# Patient Record
(demographics unavailable — no encounter records)

---

## 2024-10-25 NOTE — REVIEW OF SYSTEMS
[Fever] : fever [Chills] : no chills [Difficulty with Sleep] : no difficulty with sleep [Headache] : no headache [Ear Pain] : no ear pain [Nasal Congestion] : nasal congestion [Sore Throat] : no sore throat [Tachypnea] : not tachypneic [Wheezing] : no wheezing [Cough] : cough [Shortness of Breath] : no shortness of breath [Appetite Changes] : appetite changes [Vomiting] : no vomiting [Diarrhea] : no diarrhea [Myalgia] : no myalgia [Rash] : no rash

## 2024-10-25 NOTE — DISCUSSION/SUMMARY
[FreeTextEntry1] :  Pleasant 10 year old girl with PMH of intermittent asthma and obesity presents for F/U 2 UC visits & 1 ER visit within 3 days for fever and cough, dx with KIM PNA (radiographically-confirmed), RVP neg Completed Azithro 5 day course, half-way through Amox 10 day course Taking Albuterol 2 puffs q4h ATC for past 48 hours  Persistent fever for 6 days (albeit improved fever curve) and worsening cough for 1 day Decreased aeration on lung exam 4.5 hours post-albuterol  Concern for mild to moderate dehydration by hx  2 BTB Albuterol/Ipratropium nebs administered with improvement in aeration noted only after 2nd duoneb With improved aeration, asymmetry of breath sounds noted on exam... L lung overall clear, R lung diminished breath sounds with faint crackles  No wheezing auscultated on presentation or after nebs Prednisolone 1 mg/kg (17 mL) administered  O2 sat 94-96% throughout duration of appt  1) Pneumonia - Complete Amox course - Take OTC probiotic daily while taking antibiotic  - Continue PRN antipyretics  - Return to ER for persistent fever and cough  2) Asthma exacerbation - Continue Albuterol q4h ATC, increase to 4 puffs/dose  - Prescribed Pred 1 mg/kg once daily for an additional 4 days (10/25-10/28) - Reviewed s/sx of resp distress for which to seek urgent medical attention - F/U for resp check in 2 days   Peds Pulm referral due to 2 episodes of radiographically-diagnosed pneumonia (June 2024 & Oct 2024)  Candidate for PCV-20 or PPSV-23 vaccine?

## 2024-10-25 NOTE — PHYSICAL EXAM
[Conjuctival Injection] : no conjunctival injection [Enlarged Tonsils] : tonsils not enlarged [Exudate] : no exudate [Wheezing] : no wheezing [Crackles] : no crackles [Rhonchi] : no rhonchi [Murmur] : no murmur [FreeTextEntry1] : well-appearing, smiling, quiet, coughs during deep respirations  [FreeTextEntry3] : minimal L serous middle ear fluid [FreeTextEntry7] : normal resp rate and effort; poor aeration

## 2024-10-25 NOTE — HISTORY OF PRESENT ILLNESS
[de-identified] : pneumonia [FreeTextEntry6] :  Acute onset of headache on 10/17 Low-grade fever on 10/19 UC visit on 10/20 for fever, CXR noted PNA, prescribed Amox 400mg/5mL 11 mL BID for 10 days ([facility] through course) and Azithromycin 200mg/5mL 11 mL on day 1 and 5.5 mL on days 2-5 (completed) High fever to 103 so UC visit on 10/22, CXR noted worsening L-sided PNA, referred to ER for IV fluids and IV antibiotics  In [facility], RVP neg, CXR KIM PNA (no effusions), no blood work or IV fluids ordered; recommended Albuterol q4h ATC for 48 hours, continue current antibiotics, and F/U with PMD  Interval hx: Since ER visit, taking Albuterol 2 puffs every 4 hours while awake, last dose at 1 pm Worsening cough for past 24 hours, with 1 episode of post-tussive emesis No audible wheezing Child denies chest pain/tightness or SOB Last dose of Tylenol on 10/23 evening for temp 100 Mother is most concerned about fatigue, particularly when walking short distances (SOB?) Decreased appetite for nearly 1 week, minimal food intake today Drinking water when reminded to Urinated at least 3x today, urine appears concentrated  PMH of intermittent asthma, triggered by URIs Prior to this week, had not taken Albuterol for a couple of years Prior exacerbations requiring oral steroid, no asthma-related hospitalizations Hx of pneumonia (diagnosed by CXR at [facility] ) in June 2024; prescribed oral antibiotic Complained of headache prior to onset of fever and resp sx during that episode as well Was well after completion of antibiotic course

## 2024-10-28 NOTE — DISCUSSION/SUMMARY
[FreeTextEntry1] : 10y F with moderate persistent asthma presenting for follow up after treatment of radiographic pneumonia and course of oral steroids.   Overall at this visit she does not have increased work of breathing, tachypnea, or hypoxia. On lung exam, she has good aeration throughout with no wheezing or focal findings. However, she continues to have subjective shortness of breath and this is now her second course of oral steroids within a 6 month period, putting her in the moderate persistent asthma category.   She has been taking 2 puffs of albuterol every 4 hours during this illness. Based on her age, we have instructed her to take 4 puffs every 4 hours for the next 3-5 days, or until she is feeling better. Additionally, she has been started on a controller medication for her asthma, Fluticasone 110mcg 2 puffs once daily. She and her father have been instructed to take this medication every single day, whether she is sick or not.   Asthma action plan was completed and reviewed with them. At this time, no additional oral steroids or antibiotics are recommended. RTC in 1 month to follow up asthma control.

## 2024-10-28 NOTE — HISTORY OF PRESENT ILLNESS
[EENT/Resp Symptoms] : EENT/RESPIRATORY SYMPTOMS [de-identified] : asthma, follow up [FreeTextEntry6] : 10y F with hx of mild intermittent asthma presenting for follow up from last visit on Thursday 10/24. Was diagnosed with pneumonia last week at  and started on Amoxicillin and Azithromycin, both complete as of today. Seen last Thurs here and given 2x B2B and oral steroids, 5d course completed today. Today Sondra says she is still feeling short of breath but better than before. She has been taking 2 puffs of the albuterol every 4 hours, and it has been helpful. Her dad mentions that she had a similar asthma exacerbation a few months ago where she also required oral steroids and took a while to get better. She is also feeling generally tired. No fevers, headache, abdominal pain, chest pain.

## 2024-10-28 NOTE — PHYSICAL EXAM
[Alert] : alert [EOMI] : grossly EOMI [FROM] : full passive range of motion [Clear to Auscultation Bilaterally] : clear to auscultation bilaterally [Regular Rate and Rhythm] : regular rate and rhythm [Normal S1, S2 audible] : normal S1, S2 audible [Soft] : soft [Moves All Extremities x 4] : moves all extremities x4 [Warm, Well Perfused x4] : warm, well perfused x4 [Warm] : warm [Clear] : clear [Dry] : dry [Acute Distress] : no acute distress [Toxic] : not toxic [Conjuctival Injection] : no conjunctival injection [Clear Rhinorrhea] : no rhinorrhea [Nasal Flaring] : no nasal flaring [Erythematous Oropharynx] : nonerythematous oropharynx [Wheezing] : no wheezing [Rales] : no rales [Crackles] : no crackles [Tachypnea] : no tachypnea [Rhonchi] : no rhonchi [Subcostal Retractions] : no subcostal retractions [Suprasternal Retractions] : no suprasternal retractions [Murmur] : no murmur [Tender] : nontender [Distended] : nondistended

## 2024-11-13 NOTE — DISCUSSION/SUMMARY
[Normal Growth] : growth [Normal Development] : development  [No Elimination Concerns] : elimination [Continue Regimen] : feeding [No Skin Concerns] : skin [Normal Sleep Pattern] : sleep [None] : no medical problems [Anticipatory Guidance Given] : Anticipatory guidance addressed as per the history of present illness section [School] : school [Development and Mental Health] : development and mental health [Nutrition and Physical Activity] : nutrition and physical activity [Oral Health] : oral health [Safety] : safety [No Vaccines] : no vaccines needed [No Medications] : ~He/She~ is not on any medications [Patient] : patient [Parent/Guardian] : Parent/Guardian [FreeTextEntry1] : Sondra is a 10 yo girl with moderate persistent asthma on a daily controller medication and 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 Her asthma is well controlled and family is having good compliance with medication. She is experiencing worsening of her Achillies related symptoms, and should return to her specialist as well as wear her AFOs as she best she can tolerate. Sondra could use support in her diet and family is interested in working with a nutritionist.  - Call orthopedist to discuss AFO use.  - Family to be connected to social work for 215 form, for transportation. - Meet with dietician. - Will schedule follow up for flu vaccine  Discussed and counseled on components of 5-2-1-0: healthy active living with patient and family.   Recommended:  5 servings of fruits and vegetables per day, less than 2 hours of screen time per day, 1 hour of exercise per day, and ZERO sugar sweetened beverages. Continue to brush teeth twice daily with fluoride-containing toothpaste and make appointment to see dentist. Help child to maintain consistent daily routines and sleep schedule.  Personal hygiene and puberty explained.  School discussed. Ensure home is safe. Teach child about personal safety.  Use consistent, positive discipline.  SDOH domains were screened and scored. Return 1 year for routine well child check.

## 2024-11-13 NOTE — PHYSICAL EXAM
[Alert] : alert [No Acute Distress] : no acute distress [Normocephalic] : normocephalic [Conjunctivae with no discharge] : conjunctivae with no discharge [PERRL] : PERRL [EOMI Bilateral] : EOMI bilateral [Auricles Well Formed] : auricles well formed [Clear Tympanic membranes with present light reflex and bony landmarks] : clear tympanic membranes with present light reflex and bony landmarks [No Discharge] : no discharge [Nares Patent] : nares patent [Pink Nasal Mucosa] : pink nasal mucosa [Palate Intact] : palate intact [Nonerythematous Oropharynx] : nonerythematous oropharynx [Supple, full passive range of motion] : supple, full passive range of motion [No Palpable Masses] : no palpable masses [Symmetric Chest Rise] : symmetric chest rise [Clear to Auscultation Bilaterally] : clear to auscultation bilaterally [Regular Rate and Rhythm] : regular rate and rhythm [Normal S1, S2 present] : normal S1, S2 present [No Murmurs] : no murmurs [+2 Femoral Pulses] : +2 femoral pulses [Soft] : soft [NonTender] : non tender [Non Distended] : non distended [Normoactive Bowel Sounds] : normoactive bowel sounds [No Hepatomegaly] : no hepatomegaly [No Splenomegaly] : no splenomegaly [Patent] : patent [No fissures] : no fissures [No Abnormal Lymph Nodes Palpated] : no abnormal lymph nodes palpated [No pain or deformities with palpation of bone, muscles, joints] : no pain or deformities with palpation of bone, muscles, joints [Normal Muscle Tone] : normal muscle tone [Straight] : straight [de-identified] : Walks on tip toes, splaying of toes bilaterally. No TTP at Achillies insertion site.

## 2024-11-13 NOTE — PHYSICAL EXAM
[Alert] : alert [No Acute Distress] : no acute distress [Normocephalic] : normocephalic [Conjunctivae with no discharge] : conjunctivae with no discharge [PERRL] : PERRL [EOMI Bilateral] : EOMI bilateral [Auricles Well Formed] : auricles well formed [Clear Tympanic membranes with present light reflex and bony landmarks] : clear tympanic membranes with present light reflex and bony landmarks [No Discharge] : no discharge [Nares Patent] : nares patent [Pink Nasal Mucosa] : pink nasal mucosa [Palate Intact] : palate intact [Nonerythematous Oropharynx] : nonerythematous oropharynx [Supple, full passive range of motion] : supple, full passive range of motion [No Palpable Masses] : no palpable masses [Symmetric Chest Rise] : symmetric chest rise [Clear to Auscultation Bilaterally] : clear to auscultation bilaterally [Regular Rate and Rhythm] : regular rate and rhythm [Normal S1, S2 present] : normal S1, S2 present [No Murmurs] : no murmurs [+2 Femoral Pulses] : +2 femoral pulses [Soft] : soft [NonTender] : non tender [Non Distended] : non distended [Normoactive Bowel Sounds] : normoactive bowel sounds [No Hepatomegaly] : no hepatomegaly [No Splenomegaly] : no splenomegaly [Patent] : patent [No fissures] : no fissures [No Abnormal Lymph Nodes Palpated] : no abnormal lymph nodes palpated [No pain or deformities with palpation of bone, muscles, joints] : no pain or deformities with palpation of bone, muscles, joints [Normal Muscle Tone] : normal muscle tone [Straight] : straight [de-identified] : Walks on tip toes, splaying of toes bilaterally. No TTP at Achillies insertion site.

## 2024-11-13 NOTE — HISTORY OF PRESENT ILLNESS
[Normal] : Normal [Sleeps ___ hours per night] : sleeps [unfilled] hours per night [Brushing teeth twice/d] : brushing teeth twice per day [Yes] : Patient goes to dentist yearly [Premenarche] : premenarche [Appropiate parent-child-sibling interaction] : appropriate parent-child-sibling interaction [Has Friends] : has friends [Has chance to make own decisions] : has chance to make own decisions [Adequate social interactions] : adequate social interactions [Adequate performance] : adequate performance [No difficulties with Homework] : no difficulties with homework [No] : No cigarette smoke exposure [Appropriately restrained in motor vehicle] : appropriately restrained in motor vehicle [Supervised outdoor play] : supervised outdoor play [Wears helmet and pads] : wears helmet and pads [Parent knows child's friends] : parent knows child's friends [Parent discusses safety rules regarding adults] : parent discusses safety rules regarding adults [Family discusses home emergency plan] : family discusses home emergency plan [Monitored computer use] : monitored computer use [NO] : No [Exposure to tobacco] : no exposure to tobacco [Exposure to alcohol] : no exposure to alcohol [Exposure to electronic nicotine delivery system] : No exposure to electronic nicotine delivery system [Exposure to illicit drugs] : no exposure to illicit drugs [de-identified] : Sister [FreeTextEntry9] : Participates in gym, goes to PT 2x a week, other exercise limited. Interested in joining an after school activity.  [de-identified] : Enjoys rice and martin. Per family, She eats a lot of junk food and they could benefit from a plan. She has not seen a nutritionist before, but would be interested in seeing them. [de-identified] : Due for flu but will schedule for a follow up soon.  [FreeTextEntry1] : Pt was recently hospitalized for radiologically significant pneumonia. She was treated and is now improved. She is now on a daily controller medication and has been benefitting from the treatment. No concerns with adherence expressed from Mom.     Pt has bilateral shortened Achillies, now has AFOs but having a hard time with compliance due to pain. Pt recent illness also decreased ability to adhere to schedule. Plan was to wear for one year during waking hours per orthopedist. Pt is in PT for this concern, however, she seems to have a worsening of symptoms. Would recommend increased use and return to specialist for reevaluation. Family could also use assistance with transportation for these health appointments, they need a 215 form from social work.   Discussed possibility of seeing a nutritionist and importance of physical activity.   Due for flu vaccine but doesn't want to get it today, because she has a field trip tomorrow. Discussed importance of getting it soon given asthma hx.  Family Cardiac screen- Have you ever fainted, passed out or had an unexplained seizure suddenly and without warning, especially during exercise or in response to a certain loud noise such as doorbells, alarm clocks and ringing telephones?  NO Have you ever had exercise related chest pains or shortness of breath?  NO Has anyone in your immediate family (parents, grandparents, siblings) or other more distinct relatives (aunts, uncles, cousins)  of heart problems or had an unexpected sudden death before age 50? This would include unexpected drownings, unexplained car accident in which the relative was driving or sudden infant death syndrome.  NO Are you related to anyone with hypertrophic cardiomyopathy or hypertrophic obstructive cardiomyopathy, Marfan syndrome, arrhythmogenic right ventricular cardiomyopathy, long QT syndrome, short QT syndrome, Brugada syndrome or catecholaminergic polymorphic ventricular tachycardia or anyone younger than 50 years with a pacemaker or implantable defibrillator?  NO   Cardiac screen NEGATIVE for increased risk of cardiovascular disease.

## 2024-11-13 NOTE — HISTORY OF PRESENT ILLNESS
[Normal] : Normal [Sleeps ___ hours per night] : sleeps [unfilled] hours per night [Brushing teeth twice/d] : brushing teeth twice per day [Yes] : Patient goes to dentist yearly [Premenarche] : premenarche [Appropiate parent-child-sibling interaction] : appropriate parent-child-sibling interaction [Has Friends] : has friends [Has chance to make own decisions] : has chance to make own decisions [Adequate social interactions] : adequate social interactions [Adequate performance] : adequate performance [No difficulties with Homework] : no difficulties with homework [No] : No cigarette smoke exposure [Appropriately restrained in motor vehicle] : appropriately restrained in motor vehicle [Supervised outdoor play] : supervised outdoor play [Wears helmet and pads] : wears helmet and pads [Parent knows child's friends] : parent knows child's friends [Parent discusses safety rules regarding adults] : parent discusses safety rules regarding adults [Family discusses home emergency plan] : family discusses home emergency plan [Monitored computer use] : monitored computer use [NO] : No [Exposure to tobacco] : no exposure to tobacco [Exposure to alcohol] : no exposure to alcohol [Exposure to electronic nicotine delivery system] : No exposure to electronic nicotine delivery system [Exposure to illicit drugs] : no exposure to illicit drugs [de-identified] : Sister [FreeTextEntry9] : Participates in gym, goes to PT 2x a week, other exercise limited. Interested in joining an after school activity.  [de-identified] : Enjoys rice and martin. Per family, She eats a lot of junk food and they could benefit from a plan. She has not seen a nutritionist before, but would be interested in seeing them. [de-identified] : Due for flu but will schedule for a follow up soon.  [FreeTextEntry1] : Pt was recently hospitalized for radiologically significant pneumonia. She was treated and is now improved. She is now on a daily controller medication and has been benefitting from the treatment. No concerns with adherence expressed from Mom.     Pt has bilateral shortened Achillies, now has AFOs but having a hard time with compliance due to pain. Pt recent illness also decreased ability to adhere to schedule. Plan was to wear for one year during waking hours per orthopedist. Pt is in PT for this concern, however, she seems to have a worsening of symptoms. Would recommend increased use and return to specialist for reevaluation. Family could also use assistance with transportation for these health appointments, they need a 215 form from social work.   Discussed possibility of seeing a nutritionist and importance of physical activity.   Due for flu vaccine but doesn't want to get it today, because she has a field trip tomorrow. Discussed importance of getting it soon given asthma hx.  Family Cardiac screen- Have you ever fainted, passed out or had an unexplained seizure suddenly and without warning, especially during exercise or in response to a certain loud noise such as doorbells, alarm clocks and ringing telephones?  NO Have you ever had exercise related chest pains or shortness of breath?  NO Has anyone in your immediate family (parents, grandparents, siblings) or other more distinct relatives (aunts, uncles, cousins)  of heart problems or had an unexpected sudden death before age 50? This would include unexpected drownings, unexplained car accident in which the relative was driving or sudden infant death syndrome.  NO Are you related to anyone with hypertrophic cardiomyopathy or hypertrophic obstructive cardiomyopathy, Marfan syndrome, arrhythmogenic right ventricular cardiomyopathy, long QT syndrome, short QT syndrome, Brugada syndrome or catecholaminergic polymorphic ventricular tachycardia or anyone younger than 50 years with a pacemaker or implantable defibrillator?  NO   Cardiac screen NEGATIVE for increased risk of cardiovascular disease.

## 2024-12-24 NOTE — ASSESSMENT
[FreeTextEntry1] : 12 yo female with a known diagnosis of asthma, moderate persistent.  She was actually diagnosed at age 3 yo.and was on prn albuterol. Note of family history of asthma - mom and older sister with asthma. Asthma had apparently been intermittent with mild symptoms in the wintertime until this year 2024 following 2 episodes of pneumonia in June and most recently in October. Both episodes were associated with wheezing requiring oral steroids and initiation of inhaled steroid in November. Mom is concerned about evolving allergic triggers aside from viral URIs.  Spirometry technique is suboptimal - her first attempt at testing. Exam however was unremarkable, i..e, with clear lungs, with the exception of postnasal drip.  Asthma is confounded by being overweight. Obesity/overweight is associated with deleterious effects on the respiratory system and specifically can confound airway/lung disease.  It alters lung and chest wall mechanics resulting in airway closure, decreases the threshold for dyspnea, is associated with increased propensity for CHEPE and SHONNA which in and of themselves can confound airway disease, and is associated with systemic inflammation.  This said, she is noted to snore. Sleep disordered breathing can confound lower airway disease.  Allergic triggers likewise suspected such that she has signs and symptoms of rhinitis with postnasal drip that likewise confound lower airway disease. NO history of allergy testing. No history of eczema.  I reviewed the diagnosis of asthma, the rationale and indications for rescue and controller medications, the concept of step up and step down care vis--vis understanding seasonality, triggers and response to medications, and the appropriate manner of using or taking medications.  The patient received teaching in this regard.  A lung model was used for teaching purposes.  Moreover I prescribed Flonase and saline nebs.  Recommendations: 1. Continue Fluticasone propionate 110 ucg at 2 puffs BID. Rinse mouth after use. 2.  Indications for albuterol were reviewed. 3.  Flonase and saline nebs or saline sprays as discussed. 4.  Observe snoring. 5.  Allergy clinic referral. 6.  Discussed weight management. 7. Follow up in 6-8 weeks.

## 2024-12-24 NOTE — HISTORY OF PRESENT ILLNESS
[FreeTextEntry1] : Per PCP note 10/28/24: 10y F with hx of mild intermittent asthma presenting for follow up from last visit on Thursday 10/24. Was diagnosed with pneumonia last week at  and started on Amoxicillin and Azithromycin, both complete as of today. Seen last Thurs here and given 2x B2B and oral steroids, 5d course completed today. Today Sondra says she is still feeling short of breath but better than before. She has been taking 2 puffs of the albuterol every 4 hours, and it has been helpful. Her dad mentions that she had a similar asthma exacerbation a few months ago where she also required oral steroids and took a while to get better. She is also feeling generally tired. No fevers, headache, abdominal pain, chest pain. Dx was moderate persistent asthma, prescribed Fluticasone propionate 110 ucg a5 2 puffs BID. Had 2 courses of oral steroids in 6 months.  Well child visit 11/10/24: Pt was recently hospitalized for radiologically significant pneumonia. She was treated and is now improved. She is now on a daily controller medication and has been benefitting from the treatment. No concerns with adherence expressed from Mom.  Diagnosed with asthma at age 3 years old. Had URIs that were associated with cough and wheeze. Prescribed nebulized albuterol then. Had been stable through the years with only intermittent use of albuterol sonia. in the wintertime. However, this year, has had 2 episodes of pneumonia: in June 2024 and most recently Nov. where CXR in ED showed KIM pneumonia  Birth Hx: Term baby, no complications.  Past Hx: Immunizations are up to date. No food allergies or eczema. No hospitalizations except for hospitalization except at age 3 years for skull fracture.  Family Hx: MOm and Older sister with asthma.  Personal and Social Hx: Youngest of 3 siblings. [facility] at [facility]. No cigarette smokers at home. Nedroom is carpeted. Lots of stuffed toys.

## 2024-12-24 NOTE — HISTORY OF PRESENT ILLNESS
[FreeTextEntry1] : Per PCP note 10/28/24: 10y F with hx of mild intermittent asthma presenting for follow up from last visit on Thursday 10/24. Was diagnosed with pneumonia last week at  and started on Amoxicillin and Azithromycin, both complete as of today. Seen last Thurs here and given 2x B2B and oral steroids, 5d course completed today. Today Sondra says she is still feeling short of breath but better than before. She has been taking 2 puffs of the albuterol every 4 hours, and it has been helpful. Her dad mentions that she had a similar asthma exacerbation a few months ago where she also required oral steroids and took a while to get better. She is also feeling generally tired. No fevers, headache, abdominal pain, chest pain. Dx was moderate persistent asthma, prescribed Fluticasone propionate 110 ucg a5 2 puffs BID. Had 2 courses of oral steroids in 6 months.  Well child visit 11/10/24: Pt was recently hospitalized for radiologically significant pneumonia. She was treated and is now improved. She is now on a daily controller medication and has been benefitting from the treatment. No concerns with adherence expressed from Mom.  Diagnosed with asthma at age 3 years old. Had URIs that were associated with cough and wheeze. Prescribed nebulized albuterol then. Had been stable through the years with only intermittent use of albuterol sonia. in the wintertime. However, this year, has had 2 episodes of pneumonia: in June 2024 and most recently Nov. where CXR in ED showed KIM pneumonia  Birth Hx: Term baby, no complications.  Past Hx: Immunizations are up to date. No food allergies or eczema. No hospitalizations except for hospitalization except at age 3 years for skull fracture.  Family Hx: MOm and Older sister with asthma.  Personal and Social Hx: Youngest of 3 siblings. [facility]. No cigarette smokers at home. Nedroom is carpeted. Lots of stuffed toys.

## 2024-12-24 NOTE — ASSESSMENT
[FreeTextEntry1] : 10 yo female with a known diagnosis of asthma, moderate persistent.  She was actually diagnosed at age 3 yo.and was on prn albuterol. Note of family history of asthma - mom and older sister with asthma. Asthma had apparently been intermittent with mild symptoms in the wintertime until this year 2024 following 2 episodes of pneumonia in June and most recently in October. Both episodes were associated with wheezing requiring oral steroids and initiation of inhaled steroid in November. Mom is concerned about evolving allergic triggers aside from viral URIs.  Spirometry technique is suboptimal - her first attempt at testing. Exam however was unremarkable, i..e, with clear lungs, with the exception of postnasal drip.  Asthma is confounded by being overweight. Obesity/overweight is associated with deleterious effects on the respiratory system and specifically can confound airway/lung disease.  It alters lung and chest wall mechanics resulting in airway closure, decreases the threshold for dyspnea, is associated with increased propensity for CHEPE and SHONNA which in and of themselves can confound airway disease, and is associated with systemic inflammation.  This said, she is noted to snore. Sleep disordered breathing can confound lower airway disease.  Allergic triggers likewise suspected such that she has signs and symptoms of rhinitis with postnasal drip that likewise confound lower airway disease. NO history of allergy testing. No history of eczema.  I reviewed the diagnosis of asthma, the rationale and indications for rescue and controller medications, the concept of step up and step down care vis--vis understanding seasonality, triggers and response to medications, and the appropriate manner of using or taking medications.  The patient received teaching in this regard.  A lung model was used for teaching purposes.  Moreover I prescribed Flonase and saline nebs.  Recommendations: 1. Continue Fluticasone propionate 110 ucg at 2 puffs BID. Rinse mouth after use. 2.  Indications for albuterol were reviewed. 3.  Flonase and saline nebs or saline sprays as discussed. 4.  Observe snoring. 5.  Allergy clinic referral. 6.  Discussed weight management. 7. Follow up in 6-8 weeks.

## 2024-12-24 NOTE — HISTORY OF PRESENT ILLNESS
[FreeTextEntry1] : Per PCP note 10/28/24: 10y F with hx of mild intermittent asthma presenting for follow up from last visit on Thursday 10/24. Was diagnosed with pneumonia last week at  and started on Amoxicillin and Azithromycin, both complete as of today. Seen last Thurs here and given 2x B2B and oral steroids, 5d course completed today. Today Sondra says she is still feeling short of breath but better than before. She has been taking 2 puffs of the albuterol every 4 hours, and it has been helpful. Her dad mentions that she had a similar asthma exacerbation a few months ago where she also required oral steroids and took a while to get better. She is also feeling generally tired. No fevers, headache, abdominal pain, chest pain. Dx was moderate persistent asthma, prescribed Fluticasone propionate 110 ucg a5 2 puffs BID. Had 2 courses of oral steroids in 6 months.  Well child visit 11/10/24: Pt was recently hospitalized for radiologically significant pneumonia. She was treated and is now improved. She is now on a daily controller medication and has been benefitting from the treatment. No concerns with adherence expressed from Mom.  Diagnosed with asthma at age 3 years old. Had URIs that were associated with cough and wheeze. Prescribed nebulized albuterol then. Had been stable through the years with only intermittent use of albuterol sonia. in the wintertime. However, this year, has had 2 episodes of pneumonia: in June 2024 and most recently Nov. where CXR in ED showed KIM pneumonia  Birth Hx: Term baby, no complications.  Past Hx: Immunizations are up to date. No food allergies or eczema. No hospitalizations except for hospitalization except at age 3 years for skull fracture.  Family Hx: MOm and Older sister with asthma.  Personal and Social Hx: Youngest of 3 siblings. No pets at [employer]. No cigarette smokers at home. Nedroom is carpeted. Lots of stuffed toys.

## 2025-01-03 NOTE — END OF VISIT
[FreeTextEntry3] : I, Williams Jauregui MD, I personally performed the services described in the documentation, reviewed the documentation recorded by the scribe in my presence and it accurately and completely records my words and actions

## 2025-01-03 NOTE — HISTORY OF PRESENT ILLNESS
[FreeTextEntry1] : 11-year-old female presents today with her parents for follow up bilateral tight Achilles. She was initially seen in our office on 01/05/2023 and recommended for MRI and neurology consultation due to change in gait. Her MRI was done in March and was unremarkable. She was also evaluated by neurologist who discussed that she likely had habitual toe walking. Please see prior clinic notes for additional information.    She was last seen on 07/27/2023 and recommended for utilization of bilateral AFOs. Today her father states that she is using AFO braces. She does not have any issues with braces. Denies any radiating pain or tingling sensation. Here for further orthopedic evaluation.

## 2025-01-03 NOTE — ASSESSMENT
[FreeTextEntry1] : 11-year-old female with toe walking and Achilles contracture.  The history was obtained today from the child and parent; given the patient's age, the history was unreliable, and the parent was used as an independent historian.  Clinical findings and diagnosis were discussed at length with family. The natural history of the above condition was discussed.  Clinically, she has increased tightness and continues to be unable to walk flat even after home stretches.  I am continuing to recommend bilateral hinged AFOs to encourage a heel-toe gait and to stretch the Achilles tendons.  In addition, she will continue physical therapy for Achilles tightness. If she is unable to obtain braces operative intervention may be required. We also discussed about gastrocnemius recession for further management.  There are no orthopedic activity restrictions at this time.  We will plan to see her back in clinic approximately 2 months for repeat reevaluation.  All questions and concerns were addressed today. Parent and patient verbalize understanding and agree with plan of care.  I, Fatuma Valentino, have acted as a scribe and documented the above information for Dr. Jauregui

## 2025-01-03 NOTE — PHYSICAL EXAM
[FreeTextEntry1] : GENERAL: alert, cooperative, in NAD SKIN: The skin is intact, warm, pink and dry over the area examined. EYES: Normal conjunctiva, normal eyelids and pupils were equal and round. ENT: normal ears, normal nose and normal lips. CARDIOVASCULAR: brisk capillary refill, but no peripheral edema. RESPIRATORY: The patient is in no apparent respiratory distress. They're taking full deep breaths without use of accessory muscles or evidence of audible wheezes or stridor without the use of a stethoscope. Normal respiratory effort. ABDOMEN: not examined.    Bilateral lower extremities: Bilateral knees with full ROM.  No ligamentous laxity  With knees extended, able to be brought just to neutral dorsiflexion, + Achilles tightness is appreciated bilaterally  Ambulates on her toes unable to walk plantigrade

## 2025-01-15 NOTE — HISTORY OF PRESENT ILLNESS
[Tdap] : Tdap [Meningococcal ACWY] : Meningococcal ACWY [FreeTextEntry1] : Administered 0.5ml Tdap vaccine to left upper arm IM.  Administered 0.5ml MenACWY vaccine Menquadfi to left upper arm IM.  Patient tolerated vaccines well. Parent provided with VIS statements. Parent encouraged to call if questions or concerns arise.

## 2025-02-03 NOTE — HISTORY OF PRESENT ILLNESS
[FreeTextEntry1] : INterval history:   last seen 12/23/24; 10 yo female with a known diagnosis of asthma, moderate persistent. She was actually diagnosed at age 3 yo.and was on prn albuterol. Note of family history of asthma - mom and older sister with asthma. Asthma had apparently been intermittent with mild symptoms in the wintertime until this year 2024 following 2 episodes of pneumonia in June and most recently in October. Both episodes were associated with wheezing requiring oral steroids and initiation of inhaled steroid in November. Mom is concerned about evolving allergic triggers aside from viral URIs.  Spirometry technique is suboptimal - her first attempt at testing. Exam however was unremarkable, i..e, with clear lungs, with the exception of postnasal drip.  Asthma is confounded by being overweight. Obesity/overweight is associated with deleterious effects on the respiratory system and specifically can confound airway/lung disease. It alters lung and chest wall mechanics resulting in airway closure, decreases the threshold for dyspnea, is associated with increased propensity for CHEPE and SHONNA which in and of themselves can confound airway disease, and is associated with systemic inflammation.  This said, she is noted to snore. Sleep disordered breathing can confound lower airway disease. Allergic triggers likewise suspected such that she has signs and symptoms of rhinitis with postnasal drip that likewise confound lower airway disease. NO history of allergy testing. No history of eczema.  I reviewed the diagnosis of asthma, the rationale and indications for rescue and controller medications, the concept of step up and step down care vis--vis understanding seasonality, triggers and response to medications, and the appropriate manner of using or taking medications. The patient received teaching in this regard. A lung model was used for teaching purposes. Moreover I prescribed Flonase and saline nebs.  Recommendations: 1. Continue Fluticasone propionate 110 ucg at 2 puffs BID. Rinse mouth after use. 2. Indications for albuterol were reviewed. 3. Flonase and saline nebs or saline sprays as discussed. 4. Observe snoring. 5. Allergy clinic referral. 6. Discussed weight management. 7. Follow up in 6-8 weeks.

## 2025-06-27 NOTE — PHYSICAL EXAM
[FreeTextEntry1] : GENERAL: alert, cooperative, in NAD SKIN: The skin is intact, warm, pink and dry over the area examined. EYES: Normal conjunctiva, normal eyelids and pupils were equal and round. ENT: normal ears, normal nose and normal lips. CARDIOVASCULAR: brisk capillary refill, but no peripheral edema. RESPIRATORY: The patient is in no apparent respiratory distress. They're taking full deep breaths without use of accessory muscles or evidence of audible wheezes or stridor without the use of a stethoscope. Normal respiratory effort. ABDOMEN: not examined.   Bilateral ankle dorsiflexion to 0 with knees extended. 0-5 with knee flexion. right is a bit better then the left. Child is ambulating independently with tipi toeing gait. No falls observed. she is able to walk heel to toe and on her heel for short distances

## 2025-06-27 NOTE — HISTORY OF PRESENT ILLNESS
[FreeTextEntry1] : 11-year-old female presents today with her parents for follow up bilateral tight Achilles. She was initially seen in our office on 01/05/2023 and recommended for MRI and neurology consultation due to change in gait. Her MRI was done in March and was unremarkable. She was also evaluated by neurologist who discussed that she likely had habitual toe walking. Please see prior clinic notes for additional information.    Today, Sondra presents today for follow-up on her bilateral tight Achilles contractures.  She is wearing her AFOs at school.  She only does physical therapy at home, home exercises 3 times a week.  She still walks on her toes most of the time.  She presents today for pediatric orthopedic follow-up examination.

## 2025-06-27 NOTE — ASSESSMENT
[FreeTextEntry1] : 11-year-old female with toe walking and Achilles contracture. Today's assessment was performed with the assistance of the patient's parent as an independent historian as the patient's history is unreliable.  The recommendation at this time would be to do a LEFT gastrocnemius recession with bilateral short leg casts.  Our surgical  will contact the family in regard to a surgical date and preoperative clearance. I explained dad the surgical procedure, alternative treatment options, possible complication, post operative management. This plan was discussed with family. Family verbalizes understanding and agreement of plan. All questions and concerns were addressed today.  GALE Cerda have acted as a scribe and documented the above information for Dr. Jauregui.   This note was generated using Dragon medical dictation software. A reasonable effort has been made for proofreading its contents; however, typos may still remain. If there are any questions or points of clarification needed, please do not hesitate to contact my office.   The above documentation completed by the scribe is an accurate record of both my words and actions.   Dr. Jauregui.

## 2025-07-11 NOTE — PHYSICAL EXAM
[General Appearance - Well Developed] : interactive [General Appearance - Well-Appearing] : well appearing [General Appearance - In No Acute Distress] : in no acute distress [Sclera] : the conjunctiva were normal [Outer Ear] : the ears and nose were normal in appearance [Examination Of The Oral Cavity] : mucous membranes were moist and pink [Normal Appearance] : was normal in appearance [Neck Supple] : was supple [Respiration, Rhythm And Depth] : normal respiratory rhythm and effort [Auscultation Breath Sounds / Voice Sounds] : clear bilateral breath sounds [Heart Rate And Rhythm] : heart rate and rhythm were normal [Heart Sounds] : normal S1 and S2 [Murmurs] : no murmurs [Bowel Sounds] : normal bowel sounds [Abdomen Soft] : soft [Abdomen Tenderness] : non-tender [Abdominal Distention] : nondistended [] : no hepato-splenomegaly [Musculoskeletal Exam: Normal Movement Of All Extremities] : normal movements of all extremities [Motor Tone] : muscle strength and tone were normal [No Visual Abnormalities] : no visible abnormailities [Generalized Lymph Node Enlargement] : no lymphadenopathy [Normal] : normal texture and mobility [Initial Inspection: Infant Active And Alert] : active and alert [Enlarged Diffusely] : was not enlarged [Abnormal Color] : normal color and pigmentation [Skin Lesions 1] : no skin lesions were observed [Skin Turgor Decreased] : normal skin turgor

## 2025-07-11 NOTE — END OF VISIT
[Time Spent: ___ minutes] : I have spent [unfilled] minutes of time on the encounter which excludes teaching and separately reported services. [FreeTextEntry3] : Here today for pre op examination. No contraindications to surgery. Care maximized. With new acute URI, isolated rhinorrhea, mild, normal exam without wheezing. Recommended starting albuterol for new cough per instructions below. If requiring albuterol on day before surgery, this may be a sign of wheezing, please inform surgeon and anesthesiologist day of as this may represent a safety issue that would require deferring surgery.  - Cleared for surgery with final decision to be made by surgeon/dentist day of - Discussed surgery indication, answered parental questions - Follow surgeons post operative instructions and follow up with them as indicated - Discussed importance of informing surgery team and anesthesiologist of any new symptoms before or on the day of surgery. Not all symptoms will prevent surgery, but it is important for the team to know to assess the safety of surgery for your child - All questions answered - Form provided by family completed / provider's note printed for family  URI - Cold symptoms can last up to 10 to 14 days on average, sometimes longer - Keep your child well hydrated - Can use nasal saline drops/spray and humidifier for congestion and cough control - Can use Acetaminophen or Ibuprofen (given with food) every 6 hours as needed for discomfort or symptoms caused by fever.  - Do not use of over the counter decongestants or cough suppressants (especially if less than 6 years of age due to side effects) - Can use honey, 1 tablespoon twice daily, for cough (ONLY if older than 1 year of age, dangerous if your child is less than 1 year of age) - Call clinic for continued high fever past 48 to 72 hours for in office visit and further evaluation as this may be a sign of bacterial infection - Call office for any worsening or parental concern - Seek emergency medical attention if your child cannot tolerate anything by mouth and they pee less than 3 times in one day, patient becomes less alert or difficulty waking from sleep, have difficulty breathing (too fast or too hard), or has work of breathing such as retractions that are persistent. All of these symptoms require an emergent evaluation to rule out serious disease  Asthma - Reviewed natural course of disease and typical symptoms - Start albuterol every 4 hours with triggers, including allergies, changes in weather/seasons, URIs, or smoke exposure - Take steroid inhaler as prescribed - Call clinic if symptoms uncontrolled with albuterol or patient has frequent albuterol use - Seek immediate medical care if patient develops a new fever, fast breathing that is persistent, retractions, nasal flaring, head bobbing, grunting, becomes tired due to fast breathing or hard breathing, or you think needs albuterol more than every 4 hours - Return to clinic if patient is unable to tolerate drinking, pees less than three times in a day, or if you have trouble waking her up more than once - Refills sent, follow up with Pulmonary as soon as possible, overdue

## 2025-07-11 NOTE — HISTORY OF PRESENT ILLNESS
[Prior Anesthesia] : Prior anesthesia [Preoperative Visit] : for a medical evaluation prior to surgery [Good] : Good [Runny Nose] : runny nose  [Pulmonary Disease] : pulmonary disease [Fever] : no fever [Chills] : no chills [Earache] : no earache [Headache] : no headache [Sore Throat] : no sore throat [Cough] : no cough [Appetite] : no decrease in appetite [Nausea] : no nausea [Vomiting] : no vomiting [Abdominal Pain] : no abdominal pain [Diarrhea] : no diarrhea [Easy Bruising] : no easy bruising [Rash] : no rash [Dysuria] : no dysuria [Urinary Frequency] : no urinary frequency [Prev Anesthesia Reaction] : no previous anesthesia reaction [Diabetes] : no diabetes [Renal Disease] : no renal disease [GI Disease] : no gastrointestinal disease [Sleep Apnea] : no sleep apnea [Transfusion Reaction] : no transfusion reaction [Impaired Immunity] : no impaired immunity [Frequent use of NSAIDs] : no use of NSAIDs [Anesthesia Reaction] : no anesthesia reaction [Clotting Disorder] : no clotting disorder [Bleeding Disorder] : no bleeding disorder [Sudden Death] : no sudden death [FreeTextEntry2] : 7/16/25 [de-identified] : Dr. Jauregui [FreeTextEntry1] : Multiple family members sick, some on antibiotics Sondra had runny nose yesterday, but improved today No fatigue, cough, fever, decreased PO Hasn't started albuterol, last use was 3-4 months ago Reviewed pulmonary and orthopedics notes Stopped fluticasone HFA and fluticasone nasal spray 2 months ago, out of refills  Had anesthesia for MRI in 2023 without issue  History of snoring, worse when sick, not daily, possibly twice weekly when sick, no observed apneas, improved in last 6 months per Mom, no recent snoring